# Patient Record
Sex: FEMALE | Race: WHITE | URBAN - METROPOLITAN AREA
[De-identification: names, ages, dates, MRNs, and addresses within clinical notes are randomized per-mention and may not be internally consistent; named-entity substitution may affect disease eponyms.]

---

## 2017-01-12 ENCOUNTER — CARE COORDINATION (OUTPATIENT)
Dept: CARE COORDINATION | Facility: CLINIC | Age: 37
End: 2017-01-12

## 2017-01-12 ENCOUNTER — OFFICE VISIT (OUTPATIENT)
Dept: FAMILY MEDICINE | Facility: CLINIC | Age: 37
End: 2017-01-12
Payer: COMMERCIAL

## 2017-01-12 VITALS
HEART RATE: 77 BPM | OXYGEN SATURATION: 100 % | TEMPERATURE: 98 F | HEIGHT: 66 IN | BODY MASS INDEX: 23.78 KG/M2 | WEIGHT: 148 LBS | SYSTOLIC BLOOD PRESSURE: 122 MMHG | DIASTOLIC BLOOD PRESSURE: 83 MMHG

## 2017-01-12 DIAGNOSIS — F41.9 ANXIETY: ICD-10-CM

## 2017-01-12 DIAGNOSIS — F19.20 CHEMICAL DEPENDENCY (H): Primary | ICD-10-CM

## 2017-01-12 PROCEDURE — 99202 OFFICE O/P NEW SF 15 MIN: CPT | Performed by: NURSE PRACTITIONER

## 2017-01-12 NOTE — PROGRESS NOTES
Clinic Care Coordination Contact  OUTREACH    Referral Information:  Referral Source: Care Team  Reason for Contact: Per NP, pt is interested in Chemical Dependency treatment        Universal Utilization:   ED Visits in last year: 0  Hospital visits in last year: 0  Last PCP appointment: 01/12/17  Missed Appointments: 0  Concerns: chemical dependency        Psychosocial:  Current living arrangement:: I live in a private home  Financial/Insurance: [employer]       Resources and Interventions:  Met face-to-face with pt in the clinic. Pt said that she(pt) would like to arrange inpatient treatment for her drug use. Provided pt with some Chemical Dependency resources, including [facility]AL[facility] (Substance Abuse and Mental Health and Services Administration) and [facility] ([phone]). Discussed--in general--Chemical Dependency assessments. Pt said that she(pt) is aware of a couple treatment programs that she is interested in. Encouraged pt to call these programs directly. Gave pt writer's name and number.     Plan: Pt states that she is motivated to deal with her drug abuse and would like inpatient Chemical Dependency treatment. -[facility] will follow up with pt in 1-2 weeks to inquire as to whether pt was able to connect with a treatment program.    Maureen Chao, [facility] Care Coordination  Clinics: [place]  Email: [email or web address]  Tele: [phone]

## 2017-01-12 NOTE — NURSING NOTE
"Chief Complaint   Patient presents with     Forms     FMLA       Initial /83 mmHg  Pulse 77  Temp(Src) 98  F (36.7  C) (Oral)  Ht 5' 5.6\" (1.666 m)  Wt 148 lb (67.132 kg)  BMI 24.19 kg/m2  SpO2 100% Estimated body mass index is 24.19 kg/(m^2) as calculated from the following:    Height as of this encounter: 5' 5.6\" (1.666 m).    Weight as of this encounter: 148 lb (67.132 kg).  BP completed using cuff size: regular, right arm.  Debora Patel CMA      "

## 2017-01-12 NOTE — PROGRESS NOTES
"HPI      SUBJECTIVE:                                                    Lizz Olivas is a 36 year old female who presents to clinic today for the following health issues:      Chief Complaint   Patient presents with     Forms     FMLA       2.5 years ago started meth via eating  Was on it daily, stopped in June, and now has been on and off. Last used 2 weeks ago   Wonders if she has OCD   Also drinks but doesn't feel this is an issue  She really only drinks when using meth   Occasional Marijuana use   Has been in her job for 15 years   Started having job issues for about 1 year where she has been late and can't concentrate   Has been feeling depressed, difficult to concentrate   Strong family history of anxiety   Has never done counseling   Does have a fairly good support system. Dad in [place] for the winter and a friend here     Physically is feeling ok but has no energy or drive         No past medical history on file.  No past surgical history on file.  Social History   Substance Use Topics     Smoking status: Current Some Day Smoker     Smokeless tobacco: Never Used     Alcohol Use: 0.0 oz/week     0 Standard drinks or equivalent per week      Comment: Occ     Current Outpatient Prescriptions   Medication Sig Dispense Refill     levonorgestrel (MIRENA, 52 MG,) 20 MCG/24HR IUD 1 each by Intrauterine route once       Allergies   Allergen Reactions     No Known Allergies        Reviewed PMH, med list and allergies.        ROS  Detailed as above       /83 mmHg  Pulse 77  Temp(Src) 98  F (36.7  C) (Oral)  Ht 5' 5.6\" (1.666 m)  Wt 148 lb (67.132 kg)  BMI 24.19 kg/m2  SpO2 100%      Physical Exam   Constitutional: She is well-developed, well-nourished, and in no distress.   Neurological: She is alert.   Psychiatric: Mood and affect normal.   Vitals reviewed.      Assessment and Plan:       ICD-10-CM    1. Chemical dependency (H) F19.20    2. Anxiety F41.9        Pt is new to our clinic today   She is " wanting to get into treatment for meth as she feels her life just isn't going well, specifically her job. She has been a good employee but has been having problems the last year   Given information on treatment options   FMLA forms filled out and faxed.       The total visit time was 20 minutes more  than 50% was spent in counseling and coordination of care as discussed above.      Kathia Morel APRN, CNP  [facility]

## 2017-01-13 ASSESSMENT — PATIENT HEALTH QUESTIONNAIRE - PHQ9: SUM OF ALL RESPONSES TO PHQ QUESTIONS 1-9: 12

## 2017-03-08 ENCOUNTER — CARE COORDINATION (OUTPATIENT)
Dept: CARE COORDINATION | Facility: CLINIC | Age: 37
End: 2017-03-08

## 2017-03-08 NOTE — PROGRESS NOTES
"Clinic Care Coordination Contact  OUTREACH    Referral Information:  Referral Source: Care Team  Reason for Contact: Follow up re: chemical dependency  Care Conference: No     Universal Utilization:   ED Visits in last year: 0  Hospital visits in last year: 0  Last PCP appointment: 01/12/17  Missed Appointments: 0  Concerns: chemical dependency       Clinical Concerns:  Current Medical Concerns: No new concerns mentioned.    Current Behavioral Concerns: None mentioned.        Medication Management:  Did not discuss     Functional Status:  Mobility Status: Independent  Equipment Currently Used at Home: unknown     Psychosocial:  Current living arrangement: Pt resides alone.  Financial/Insurance: [employer]     [facility] Number: N/A  Senior Linkage Line Referral Placed: N/A  Advanced Care Plans/Directives on file:: No  Referrals Placed: none  Frequency of Care Coordination: No further Care Coordination contact planned.  Upcoming appointment: No PCP visit is scheduled.    Resources and Interventions:  Writer called pt this afternoon. Pt said that she(pt) completed a 28-day inpatient Chemical Dependency treatment program at [facility] in [place], [place] this past Monday (2/27/17). Pt is now in a five-week Chemical Dependency Day Treatment program in [place]. (Pt is going to Day Treatment from 9:00 am--2:00 pm Mondays--Fridays.)  Pt's plan after the Day Treatment program is to return to work and to become involved in several support Chemical Dependency programs in order to help maintain her sobriety.    Pt felt that the [facility] inpatient program was very helpful, and pt feels \"really good\" at this time.      Plan: Pt is actively involved in a program for Chemical Dependency and appears motivated to maintain her sobriety. [facility] will not plan any further follow up with pt. Have changed pt's status to Inactive.    Maureen Chao, [facility] Care Coordination  Clinic: [place]   Email: [email or web address]  Tele: " [phone]

## 2017-03-13 ENCOUNTER — TELEPHONE (OUTPATIENT)
Dept: FAMILY MEDICINE | Facility: CLINIC | Age: 37
End: 2017-03-13

## 2017-04-04 ENCOUNTER — OFFICE VISIT (OUTPATIENT)
Dept: FAMILY MEDICINE | Facility: CLINIC | Age: 37
End: 2017-04-04
Payer: COMMERCIAL

## 2017-04-04 VITALS
SYSTOLIC BLOOD PRESSURE: 101 MMHG | HEIGHT: 66 IN | OXYGEN SATURATION: 100 % | WEIGHT: 148 LBS | HEART RATE: 79 BPM | DIASTOLIC BLOOD PRESSURE: 64 MMHG | TEMPERATURE: 98.1 F | BODY MASS INDEX: 23.78 KG/M2

## 2017-04-04 DIAGNOSIS — Z02.89 ENCOUNTER FOR REVIEW OF FORM WITH PATIENT: Primary | ICD-10-CM

## 2017-04-04 PROCEDURE — 99213 OFFICE O/P EST LOW 20 MIN: CPT | Performed by: NURSE PRACTITIONER

## 2017-04-04 NOTE — LETTER
[place] [address] [facility] [address]  Phone: [phone]    April 4, 2017        To whom it may concern:    RE: Lizz Olivas      Patient was seen and treated at our clinic.  I had initially evaluated the patient on 1/12/17 for a chronic medical condition requiring her to be absent from work. Please also include the following dates for her FMLA absence: 12/20/17, 12/21/17, 1/5/17, 1/6/17, 1/9/17, 1/10/17, 1/11/17.         Please contact me for questions or concerns.      Sincerely,        SUNITA Araujo CNP

## 2017-04-04 NOTE — PROGRESS NOTES
"HPI      SUBJECTIVE:                                                    Lizz Olivas is a 36 year old female who presents to clinic today for the following health issues:      Chief Complaint   Patient presents with     Forms     FMLA       Recently finished treatment, inpatient then day treatment. Now living at home and doing well.   Scheduled to return on Thursday of this week which was the day listed on her FMLA   Also her work is not counting the original date listed on the FMLA form, only from the date seen. She requests a note to help cover the preceding month, which is very reasonable.   She is otherwise doing well and is excited to get back to work and a normal routine       No past medical history on file.  No past surgical history on file.  Social History   Substance Use Topics     Smoking status: Current Some Day Smoker     Smokeless tobacco: Never Used     Alcohol use 0.0 oz/week     0 Standard drinks or equivalent per week      Comment: Occ     Current Outpatient Prescriptions   Medication Sig Dispense Refill     levonorgestrel (MIRENA, 52 MG,) 20 MCG/24HR IUD 1 each by Intrauterine route once       Allergies   Allergen Reactions     No Known Allergies        Reviewed PMH, med list and allergies.      ROS  Detailed as above       /64 (BP Location: Right arm, Cuff Size: Adult Regular)  Pulse 79  Temp 98.1  F (36.7  C) (Oral)  Ht 5' 5.6\" (1.666 m)  Wt 148 lb (67.1 kg)  SpO2 100%  BMI 24.18 kg/m2      Physical Exam   Constitutional: She is well-developed, well-nourished, and in no distress.   Neurological: She is alert.   Psychiatric: Mood and affect normal.       Assessment and Plan:       ICD-10-CM    1. Encounter for review of form with patient Z02.89        Doing well since treatment  Letter written for pt to supplement FMLA forms from previous    Her HR may need to fax us different forms to fill out      The total visit time was 15 minutes more  than 50% was spent in counseling and " coordination of care as discussed above.      Kathia Morel APRN, CNP  [facility]

## 2017-04-04 NOTE — MR AVS SNAPSHOT
"              After Visit Summary   4/4/2017    Lizz Olivas    MRN: [id number]           Patient Information     Date Of Birth          1980        Visit Information        Provider Department      4/4/2017 10:00 AM Kathia Morel APRN CNP [facility]        Today's Diagnoses     Encounter for review of form with patient    -  1       Follow-ups after your visit        Who to contact     If you have questions or need follow up information about today's clinic visit or your schedule please contact [facility] directly at [phone].  Normal or non-critical lab and imaging results will be communicated to you by MyChart, letter or phone within 4 business days after the clinic has received the results. If you do not hear from us within 7 days, please contact the clinic through MyChart or phone. If you have a critical or abnormal lab result, we will notify you by phone as soon as possible.  Submit refill requests through [employer] or call your pharmacy and they will forward the refill request to us. Please allow 3 business days for your refill to be completed.          Additional Information About Your Visit        Care EveryWhere ID     This is your Care EveryWhere ID. This could be used by other organizations to access your [place] medical records  [id number]        Your Vitals Were     Pulse Temperature Height Pulse Oximetry BMI (Body Mass Index)       79 98.1  F (36.7  C) (Oral) 5' 5.6\" (1.666 m) 100% 24.18 kg/m2        Blood Pressure from Last 3 Encounters:   04/04/17 101/64   01/12/17 122/83    Weight from Last 3 Encounters:   04/04/17 148 lb (67.1 kg)   01/12/17 148 lb (67.1 kg)              Today, you had the following     No orders found for display       Primary Care Provider    None Specified       No primary provider on file.        Thank you!     Thank you for choosing [facility]  for your care. Our goal is always to provide you with excellent care. " Hearing back from our patients is one way we can continue to improve our services. Please take a few minutes to complete the written survey that you may receive in the mail after your visit with us. Thank you!             Your Updated Medication List - Protect others around you: Learn how to safely use, store and throw away your medicines at www.disposemymeds.org.          This list is accurate as of: 4/4/17  2:27 PM.  Always use your most recent med list.                   Brand Name Dispense Instructions for use    MIRENA (52 MG) 20 MCG/24HR IUD   Generic drug:  levonorgestrel      1 each by Intrauterine route once

## 2017-04-04 NOTE — NURSING NOTE
"Chief Complaint   Patient presents with     Forms     FMLA       Initial /64 (BP Location: Right arm, Cuff Size: Adult Regular)  Pulse 79  Temp 98.1  F (36.7  C) (Oral)  Ht 5' 5.6\" (1.666 m)  Wt 148 lb (67.1 kg)  SpO2 100%  BMI 24.18 kg/m2 Estimated body mass index is 24.18 kg/(m^2) as calculated from the following:    Height as of this encounter: 5' 5.6\" (1.666 m).    Weight as of this encounter: 148 lb (67.1 kg).  Medication Reconciliation: complete.    Debora Patel CMA      "

## 2017-07-07 ENCOUNTER — HOSPITAL ENCOUNTER (EMERGENCY)
Facility: CLINIC | Age: 37
Discharge: HOME | End: 2017-07-08
Attending: EMERGENCY MEDICINE | Admitting: EMERGENCY MEDICINE
Payer: COMMERCIAL

## 2017-07-07 VITALS — OXYGEN SATURATION: 99 % | DIASTOLIC BLOOD PRESSURE: 92 MMHG | SYSTOLIC BLOOD PRESSURE: 138 MMHG

## 2017-07-07 DIAGNOSIS — T74.21XA SEXUAL ASSAULT OF ADULT, INITIAL ENCOUNTER: ICD-10-CM

## 2017-07-07 DIAGNOSIS — S40.029A CONTUSION OF UPPER ARM, UNSPECIFIED LATERALITY, INITIAL ENCOUNTER: ICD-10-CM

## 2017-07-07 PROCEDURE — 25000125 ZZHC RX 250

## 2017-07-07 PROCEDURE — 99285 EMERGENCY DEPT VISIT HI MDM: CPT | Mod: 25

## 2017-07-07 PROCEDURE — 25000132 ZZH RX MED GY IP 250 OP 250 PS 637: Performed by: EMERGENCY MEDICINE

## 2017-07-07 PROCEDURE — 96372 THER/PROPH/DIAG INJ SC/IM: CPT

## 2017-07-07 PROCEDURE — 25000128 H RX IP 250 OP 636: Performed by: EMERGENCY MEDICINE

## 2017-07-07 RX ORDER — ONDANSETRON 4 MG/1
4 TABLET, ORALLY DISINTEGRATING ORAL ONCE
Status: COMPLETED | OUTPATIENT
Start: 2017-07-07 | End: 2017-07-07

## 2017-07-07 RX ORDER — METRONIDAZOLE 500 MG/1
2000 TABLET ORAL ONCE
Qty: 4 TABLET | Refills: 0 | Status: SHIPPED | OUTPATIENT
Start: 2017-07-08 | End: 2017-07-08

## 2017-07-07 RX ORDER — EMTRICITABINE AND TENOFOVIR DISOPROXIL FUMARATE 200; 300 MG/1; MG/1
1 TABLET, FILM COATED ORAL DAILY
Qty: 2 TABLET | Refills: 0 | Status: SHIPPED | OUTPATIENT
Start: 2017-07-07 | End: 2017-07-09

## 2017-07-07 RX ORDER — ONDANSETRON 4 MG/1
4 TABLET, ORALLY DISINTEGRATING ORAL EVERY 8 HOURS PRN
Qty: 10 TABLET | Refills: 0 | Status: SHIPPED | OUTPATIENT
Start: 2017-07-07 | End: 2017-07-10

## 2017-07-07 RX ORDER — EMTRICITABINE AND TENOFOVIR DISOPROXIL FUMARATE 200; 300 MG/1; MG/1
1 TABLET, FILM COATED ORAL DAILY
Qty: 24 TABLET | Refills: 0 | Status: SHIPPED | OUTPATIENT
Start: 2017-07-10 | End: 2017-11-02

## 2017-07-07 RX ORDER — EMTRICITABINE AND TENOFOVIR DISOPROXIL FUMARATE 200; 300 MG/1; MG/1
1 TABLET, FILM COATED ORAL ONCE
Status: COMPLETED | OUTPATIENT
Start: 2017-07-07 | End: 2017-07-07

## 2017-07-07 RX ORDER — ONDANSETRON 4 MG/1
TABLET, ORALLY DISINTEGRATING ORAL
Status: COMPLETED
Start: 2017-07-07 | End: 2017-07-07

## 2017-07-07 RX ORDER — CEFTRIAXONE SODIUM 1 G
250 VIAL (EA) INJECTION ONCE
Status: COMPLETED | OUTPATIENT
Start: 2017-07-07 | End: 2017-07-07

## 2017-07-07 RX ORDER — EMTRICITABINE AND TENOFOVIR DISOPROXIL FUMARATE 200; 300 MG/1; MG/1
1 TABLET, FILM COATED ORAL DAILY
Qty: 26 TABLET | Refills: 0 | Status: SHIPPED | OUTPATIENT
Start: 2017-07-07 | End: 2017-08-02

## 2017-07-07 RX ORDER — IBUPROFEN 200 MG
400 TABLET ORAL ONCE
Status: COMPLETED | OUTPATIENT
Start: 2017-07-07 | End: 2017-07-07

## 2017-07-07 RX ORDER — AZITHROMYCIN 250 MG/1
1000 TABLET, FILM COATED ORAL ONCE
Status: COMPLETED | OUTPATIENT
Start: 2017-07-07 | End: 2017-07-07

## 2017-07-07 RX ORDER — EMTRICITABINE AND TENOFOVIR DISOPROXIL FUMARATE 200; 300 MG/1; MG/1
1 TABLET, FILM COATED ORAL DAILY
Status: DISCONTINUED | OUTPATIENT
Start: 2017-07-08 | End: 2017-07-07

## 2017-07-07 RX ORDER — METRONIDAZOLE 500 MG/1
2000 TABLET ORAL ONCE
Qty: 4 TABLET | Refills: 0 | Status: SHIPPED | OUTPATIENT
Start: 2017-07-07 | End: 2017-07-07

## 2017-07-07 RX ADMIN — ONDANSETRON 4 MG: 4 TABLET, ORALLY DISINTEGRATING ORAL at 22:58

## 2017-07-07 RX ADMIN — DOLUTEGRAVIR SODIUM 50 MG: 50 TABLET, FILM COATED ORAL at 23:17

## 2017-07-07 RX ADMIN — AZITHROMYCIN 1000 MG: 250 TABLET, FILM COATED ORAL at 23:17

## 2017-07-07 RX ADMIN — EMTRICITABINE AND TENOFOVIR DISOPROXIL FUMARATE 1 TABLET: 200; 300 TABLET, FILM COATED ORAL at 23:17

## 2017-07-07 RX ADMIN — IBUPROFEN 400 MG: 200 TABLET, FILM COATED ORAL at 19:12

## 2017-07-07 RX ADMIN — CEFTRIAXONE 250 MG: 1 INJECTION, POWDER, FOR SOLUTION INTRAMUSCULAR; INTRAVENOUS at 23:54
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
## 2017-07-07 NOTE — ED AVS SNAPSHOT
[facility] Emergency Department    [address]    [facility] [address]    Phone:  [phone]    Fax:  [phone]                                       Lizz Olivas   MRN: [id number]    Department:  [facility] Emergency Department   Date of Visit:  7/7/2017           After Visit Summary Signature Page     I have received my discharge instructions, and my questions have been answered. I have discussed any challenges I see with this plan with the nurse or doctor.    ..........................................................................................................................................  Patient/Patient Representative Signature      ..........................................................................................................................................  Patient Representative Print Name and Relationship to Patient    ..................................................               ................................................  Date                                            Time    ..........................................................................................................................................  Reviewed by Signature/Title    ...................................................              ..............................................  Date                                                            Time

## 2017-07-07 NOTE — LETTER
[facility] EMERGENCY DEPARTMENT  [address]  [facility] [address]  [phone]    2017    Lizz Olivas  [address] [facility] [address]  [phone] (home) none (work)    : 1980      To Whom it may concern:    Lizz Olivas was seen in our Emergency Department today, 2017.  I expect her condition to improve over the next 4 days.  She may return to work when improved.    Sincerely,        Toya Calabrese MD

## 2017-11-02 ENCOUNTER — HOSPITAL ENCOUNTER (EMERGENCY)
Facility: CLINIC | Age: 37
Discharge: HOME | End: 2017-11-02
Attending: PSYCHIATRY & NEUROLOGY | Admitting: PSYCHIATRY & NEUROLOGY
Payer: COMMERCIAL

## 2017-11-02 VITALS
OXYGEN SATURATION: 98 % | DIASTOLIC BLOOD PRESSURE: 75 MMHG | HEART RATE: 111 BPM | RESPIRATION RATE: 16 BRPM | TEMPERATURE: 96.4 F | SYSTOLIC BLOOD PRESSURE: 120 MMHG

## 2017-11-02 DIAGNOSIS — F43.10 PTSD (POST-TRAUMATIC STRESS DISORDER): ICD-10-CM

## 2017-11-02 PROCEDURE — 99285 EMERGENCY DEPT VISIT HI MDM: CPT | Mod: 25 | Performed by: PSYCHIATRY & NEUROLOGY

## 2017-11-02 PROCEDURE — 99284 EMERGENCY DEPT VISIT MOD MDM: CPT | Mod: Z6 | Performed by: PSYCHIATRY & NEUROLOGY

## 2017-11-02 PROCEDURE — 90791 PSYCH DIAGNOSTIC EVALUATION: CPT

## 2017-11-02 PROCEDURE — 25000132 ZZH RX MED GY IP 250 OP 250 PS 637: Performed by: EMERGENCY MEDICINE

## 2017-11-02 RX ADMIN — NICOTINE POLACRILEX 2 MG: 2 GUM, CHEWING ORAL at 21:07

## 2017-11-02 ASSESSMENT — ENCOUNTER SYMPTOMS
HALLUCINATIONS: 0
DIZZINESS: 0
CHEST TIGHTNESS: 0
DYSPHORIC MOOD: 0
SHORTNESS OF BREATH: 0
NERVOUS/ANXIOUS: 0
ABDOMINAL PAIN: 0
BACK PAIN: 0
FEVER: 0

## 2017-11-02 NOTE — ED AVS SNAPSHOT
[facility], Emergency Department    [address]    [facility] [address]    Phone:  [phone]    Fax:  [phone]                                       Lizz Olivas   MRN: [id number]    Department:  [facility], Emergency Department   Date of Visit:  11/2/2017           After Visit Summary Signature Page     I have received my discharge instructions, and my questions have been answered. I have discussed any challenges I see with this plan with the nurse or doctor.    ..........................................................................................................................................  Patient/Patient Representative Signature      ..........................................................................................................................................  Patient Representative Print Name and Relationship to Patient    ..................................................               ................................................  Date                                            Time    ..........................................................................................................................................  Reviewed by Signature/Title    ...................................................              ..............................................  Date                                                            Time

## 2017-11-02 NOTE — ED AVS SNAPSHOT
[facility], Emergency Department    [address]    [place] [address]    Phone:  [phone]    Fax:  [phone]                                       Lizz Olivas   MRN: [id number]    Department:  [facility], Emergency Department   Date of Visit:  11/2/2017           Patient Information     Date Of Birth          1980        Your diagnoses for this visit were:     PTSD (post-traumatic stress disorder)        You were seen by Felix Ramos MD.        Discharge Instructions       Follow up with your therapist    24 Hour Appointment Hotline       To make an appointment at any [facility] clinic, call [phone] ([phone]). If you don't have a family doctor or clinic, we will help you find one. [facility] clinics are conveniently located to serve the needs of you and your family.             Review of your medicines      Our records show that you are taking the medicines listed below. If these are incorrect, please call your family doctor or clinic.        Dose / Directions Last dose taken    MIRENA (52 MG) 20 MCG/24HR IUD   Dose:  1 each   Generic drug:  levonorgestrel        1 each by Intrauterine route once   Refills:  0                Orders Needing Specimen Collection     Ordered          11/02/17 2008  Drug abuse screen 6 urine (tox) - STAT, [facility]: STAT, Needs to be Collected     Scheduled Task Status   11/02/17 2009 Collect Drug abuse screen 6 urine (tox) Open   Order Class:  PCU Collect                11/02/17 2008  HCG qualitative urine - STAT, [facility]: STAT, Needs to be Collected     Scheduled Task Status   11/02/17 2009 Collect HCG qualitative urine Open   Order Class:  PCU Collect                  Pending Results     No orders found from 10/31/2017 to 11/3/2017.            Pending Culture Results     No orders found from 10/31/2017 to 11/3/2017.            Pending Results Instructions     If you had any lab results that were not finalized at the time of your Discharge, you  "can call the ED Lab Result RN at [phone]. You will be contacted by this team for any positive Lab results or changes in treatment. The nurses are available 7 days a week from 10A to 6:30P.  You can leave a message 24 hours per day and they will return your call.        Thank you for choosing [facility]       Thank you for choosing [facility] for your care. Our goal is always to provide you with excellent care. Hearing back from our patients is one way we can continue to improve our services. Please take a few minutes to complete the written survey that you may receive in the mail after you visit with us. Thank you!        [employer] Information     [employer] lets you send messages to your doctor, view your test results, renew your prescriptions, schedule appointments and more. To sign up, go to [email or web address] . Click on \"Log in\" on the left side of the screen, which will take you to the Welcome page. Then click on \"Sign up Now\" on the right side of the page.     You will be asked to enter the access code listed below, as well as some personal information. Please follow the directions to create your username and password.     Your access code is: [email or web address]  Expires: 2018 10:55 PM     Your access code will  in 90 days. If you need help or a new code, please call your [facility] clinic or [phone].        Care EveryWhere ID     This is your Care EveryWhere ID. This could be used by other organizations to access your [facility] medical records  [id number]        Equal Access to Services     LEON FISHER : Hadii araceli sandhu hadasho Soleeali, waaxda luqadaha, qaybta kaalmada adesoniayada, ang marshall. [facility] [phone].    ATENCIÓN: Si habla español, tiene a romo disposición servicios gratuitos de asistencia lingüística. Llame al [phone].    We comply with applicable federal civil rights laws and [place] laws. We do not discriminate on the basis of race, color, national " origin, age, disability, sex, sexual orientation, or gender identity.            After Visit Summary       This is your record. Keep this with you and show to your community pharmacist(s) and doctor(s) at your next visit.
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
## 2017-11-03 NOTE — ED NOTES
Bed: HW01  Expected date: 11/2/17  Expected time: 7:55 PM  Means of arrival: Ambulance  Comments:  37 F  Suicidal